# Patient Record
Sex: FEMALE | Race: WHITE | NOT HISPANIC OR LATINO | Employment: FULL TIME | URBAN - NONMETROPOLITAN AREA
[De-identification: names, ages, dates, MRNs, and addresses within clinical notes are randomized per-mention and may not be internally consistent; named-entity substitution may affect disease eponyms.]

---

## 2017-08-09 RX ORDER — PHENOL 1.4 %
10 AEROSOL, SPRAY (ML) MUCOUS MEMBRANE
COMMUNITY
End: 2019-11-26

## 2017-08-09 RX ORDER — TURMERIC ROOT EXTRACT 500 MG
500 TABLET ORAL 2 TIMES DAILY
COMMUNITY
End: 2019-11-26

## 2017-08-22 ENCOUNTER — HOSPITAL ENCOUNTER (OUTPATIENT)
Facility: HOSPITAL | Age: 52
Setting detail: HOSPITAL OUTPATIENT SURGERY
Discharge: HOME | End: 2017-08-22
Attending: OPHTHALMOLOGY | Admitting: OPHTHALMOLOGY

## 2017-08-22 ENCOUNTER — ANESTHESIA EVENT (OUTPATIENT)
Dept: PERIOP | Facility: HOSPITAL | Age: 52
End: 2017-08-22

## 2017-08-22 ENCOUNTER — ANESTHESIA (OUTPATIENT)
Dept: PERIOP | Facility: HOSPITAL | Age: 52
End: 2017-08-22

## 2017-08-22 VITALS
HEART RATE: 78 BPM | WEIGHT: 200 LBS | RESPIRATION RATE: 18 BRPM | SYSTOLIC BLOOD PRESSURE: 135 MMHG | BODY MASS INDEX: 36.8 KG/M2 | HEIGHT: 62 IN | DIASTOLIC BLOOD PRESSURE: 85 MMHG | OXYGEN SATURATION: 98 % | TEMPERATURE: 98.1 F

## 2017-08-22 PROBLEM — H26.9 CATARACT OF RIGHT EYE: Status: ACTIVE | Noted: 2017-08-22

## 2017-08-22 PROBLEM — H26.9 CATARACT OF RIGHT EYE: Status: RESOLVED | Noted: 2017-08-22 | Resolved: 2017-08-22

## 2017-08-22 LAB — B-HCG UR QL: NEGATIVE

## 2017-08-22 PROCEDURE — 25010000002 EPINEPHRINE 1 MG/ML SOLUTION 1 ML VIAL: Performed by: OPHTHALMOLOGY

## 2017-08-22 PROCEDURE — 81025 URINE PREGNANCY TEST: CPT | Performed by: OPHTHALMOLOGY

## 2017-08-22 PROCEDURE — V2632 POST CHMBR INTRAOCULAR LENS: HCPCS | Performed by: OPHTHALMOLOGY

## 2017-08-22 PROCEDURE — 25010000002 MIDAZOLAM PER 1 MG: Performed by: NURSE ANESTHETIST, CERTIFIED REGISTERED

## 2017-08-22 DEVICE — LENS ACRYSOF IQ 6X13MM SN60WF 18.5: Type: IMPLANTABLE DEVICE | Site: EYE | Status: FUNCTIONAL

## 2017-08-22 RX ORDER — CYCLOPENTOLATE HYDROCHLORIDE 20 MG/ML
1 SOLUTION/ DROPS OPHTHALMIC
Status: COMPLETED | OUTPATIENT
Start: 2017-08-22 | End: 2017-08-22

## 2017-08-22 RX ORDER — PHENYLEPHRINE HYDROCHLORIDE 100 MG/ML
SOLUTION/ DROPS OPHTHALMIC
Status: COMPLETED
Start: 2017-08-22 | End: 2017-08-22

## 2017-08-22 RX ORDER — PILOCARPINE HYDROCHLORIDE 10 MG/ML
SOLUTION/ DROPS OPHTHALMIC AS NEEDED
Status: DISCONTINUED | OUTPATIENT
Start: 2017-08-22 | End: 2017-08-22 | Stop reason: HOSPADM

## 2017-08-22 RX ORDER — PHENYLEPHRINE HYDROCHLORIDE 100 MG/ML
1 SOLUTION/ DROPS OPHTHALMIC
Status: COMPLETED | OUTPATIENT
Start: 2017-08-22 | End: 2017-08-22

## 2017-08-22 RX ORDER — CYCLOPENTOLATE HYDROCHLORIDE 20 MG/ML
SOLUTION/ DROPS OPHTHALMIC
Status: COMPLETED
Start: 2017-08-22 | End: 2017-08-22

## 2017-08-22 RX ORDER — MIDAZOLAM HYDROCHLORIDE 1 MG/ML
INJECTION INTRAMUSCULAR; INTRAVENOUS AS NEEDED
Status: DISCONTINUED | OUTPATIENT
Start: 2017-08-22 | End: 2017-08-22 | Stop reason: SURG

## 2017-08-22 RX ORDER — ENALAPRILAT 2.5 MG/2ML
1.25 INJECTION INTRAVENOUS ONCE AS NEEDED
Status: DISCONTINUED | OUTPATIENT
Start: 2017-08-22 | End: 2017-08-22 | Stop reason: HOSPADM

## 2017-08-22 RX ORDER — PREDNISOLONE ACETATE 10 MG/ML
SUSPENSION/ DROPS OPHTHALMIC AS NEEDED
Status: DISCONTINUED | OUTPATIENT
Start: 2017-08-22 | End: 2017-08-22 | Stop reason: HOSPADM

## 2017-08-22 RX ORDER — BALANCED SALT SOLUTION 6.4; .75; .48; .3; 3.9; 1.7 MG/ML; MG/ML; MG/ML; MG/ML; MG/ML; MG/ML
SOLUTION OPHTHALMIC AS NEEDED
Status: DISCONTINUED | OUTPATIENT
Start: 2017-08-22 | End: 2017-08-22 | Stop reason: HOSPADM

## 2017-08-22 RX ORDER — TETRACAINE HYDROCHLORIDE 5 MG/ML
SOLUTION OPHTHALMIC
Status: COMPLETED
Start: 2017-08-22 | End: 2017-08-22

## 2017-08-22 RX ORDER — SODIUM CHLORIDE 0.9 % (FLUSH) 0.9 %
3 SYRINGE (ML) INJECTION AS NEEDED
Status: DISCONTINUED | OUTPATIENT
Start: 2017-08-22 | End: 2017-08-22 | Stop reason: HOSPADM

## 2017-08-22 RX ORDER — SODIUM CHLORIDE, SODIUM LACTATE, POTASSIUM CHLORIDE, CALCIUM CHLORIDE 600; 310; 30; 20 MG/100ML; MG/100ML; MG/100ML; MG/100ML
500 INJECTION, SOLUTION INTRAVENOUS CONTINUOUS PRN
Status: DISCONTINUED | OUTPATIENT
Start: 2017-08-22 | End: 2017-08-22 | Stop reason: HOSPADM

## 2017-08-22 RX ORDER — LIDOCAINE HYDROCHLORIDE 40 MG/ML
INJECTION, SOLUTION RETROBULBAR; TOPICAL AS NEEDED
Status: DISCONTINUED | OUTPATIENT
Start: 2017-08-22 | End: 2017-08-22 | Stop reason: HOSPADM

## 2017-08-22 RX ORDER — SODIUM CHLORIDE 0.9 % (FLUSH) 0.9 %
1-10 SYRINGE (ML) INJECTION AS NEEDED
Status: DISCONTINUED | OUTPATIENT
Start: 2017-08-22 | End: 2017-08-22 | Stop reason: HOSPADM

## 2017-08-22 RX ORDER — PREDNISOLONE ACETATE 10 MG/ML
1 SUSPENSION/ DROPS OPHTHALMIC
Status: DISCONTINUED | OUTPATIENT
Start: 2017-08-22 | End: 2017-08-22 | Stop reason: HOSPADM

## 2017-08-22 RX ORDER — TETRACAINE HYDROCHLORIDE 5 MG/ML
1 SOLUTION OPHTHALMIC SEE ADMIN INSTRUCTIONS
Status: COMPLETED | OUTPATIENT
Start: 2017-08-22 | End: 2017-08-22

## 2017-08-22 RX ORDER — PREDNISOLONE ACETATE 10 MG/ML
SUSPENSION/ DROPS OPHTHALMIC
Status: DISCONTINUED
Start: 2017-08-22 | End: 2017-08-22 | Stop reason: HOSPADM

## 2017-08-22 RX ADMIN — TETRACAINE HYDROCHLORIDE 1 DROP: 5 SOLUTION OPHTHALMIC at 09:52

## 2017-08-22 RX ADMIN — CYCLOPENTOLATE HYDROCHLORIDE 1 DROP: 20 SOLUTION/ DROPS OPHTHALMIC at 09:53

## 2017-08-22 RX ADMIN — SODIUM CHLORIDE, POTASSIUM CHLORIDE, SODIUM LACTATE AND CALCIUM CHLORIDE 500 ML: 600; 310; 30; 20 INJECTION, SOLUTION INTRAVENOUS at 10:02

## 2017-08-22 RX ADMIN — TETRACAINE HYDROCHLORIDE 1 DROP: 5 SOLUTION OPHTHALMIC at 09:51

## 2017-08-22 RX ADMIN — CYCLOPENTOLATE HYDROCHLORIDE 1 DROP: 20 SOLUTION/ DROPS OPHTHALMIC at 10:03

## 2017-08-22 RX ADMIN — MIDAZOLAM HYDROCHLORIDE 0.5 MG: 1 INJECTION, SOLUTION INTRAMUSCULAR; INTRAVENOUS at 10:45

## 2017-08-22 RX ADMIN — MIDAZOLAM HYDROCHLORIDE 0.5 MG: 1 INJECTION, SOLUTION INTRAMUSCULAR; INTRAVENOUS at 10:50

## 2017-08-22 RX ADMIN — MIDAZOLAM HYDROCHLORIDE 0.5 MG: 1 INJECTION, SOLUTION INTRAMUSCULAR; INTRAVENOUS at 10:53

## 2017-08-22 RX ADMIN — PHENYLEPHRINE HYDROCHLORIDE 1 DROP: 100 SOLUTION/ DROPS OPHTHALMIC at 09:53

## 2017-08-22 RX ADMIN — PHENYLEPHRINE HYDROCHLORIDE 1 DROP: 100 SOLUTION/ DROPS OPHTHALMIC at 10:03

## 2017-08-22 RX ADMIN — PHENYLEPHRINE HYDROCHLORIDE 1 DROP: 100 SOLUTION/ DROPS OPHTHALMIC at 09:58

## 2017-08-22 RX ADMIN — CYCLOPENTOLATE HYDROCHLORIDE 1 DROP: 20 SOLUTION/ DROPS OPHTHALMIC at 09:58

## 2017-08-22 RX ADMIN — MIDAZOLAM HYDROCHLORIDE 1 MG: 1 INJECTION, SOLUTION INTRAMUSCULAR; INTRAVENOUS at 10:43

## 2017-08-22 RX ADMIN — MIDAZOLAM HYDROCHLORIDE 0.5 MG: 1 INJECTION, SOLUTION INTRAMUSCULAR; INTRAVENOUS at 10:56

## 2017-08-22 RX ADMIN — MIDAZOLAM HYDROCHLORIDE 0.5 MG: 1 INJECTION, SOLUTION INTRAMUSCULAR; INTRAVENOUS at 11:06

## 2017-08-22 RX ADMIN — MIDAZOLAM HYDROCHLORIDE 0.5 MG: 1 INJECTION, SOLUTION INTRAMUSCULAR; INTRAVENOUS at 10:59

## 2017-08-22 NOTE — PLAN OF CARE
Problem: Cataract (Adult)  Goal: Signs and Symptoms of Listed Potential Problems Will be Absent or Manageable (Cataract)  Outcome: Ongoing (interventions implemented as appropriate)    08/22/17 0957   Cataract   Problems Assessed (Cataract) all   Problems Present (Cataract) none

## 2017-08-22 NOTE — ANESTHESIA POSTPROCEDURE EVALUATION
Patient: Elissa Becker    Procedure Summary     Date Anesthesia Start Anesthesia Stop Room / Location    08/22/17 1048  [facility]       Procedure Diagnosis Surgeon Provider    CATARACT PHACO EXTRACTION WITH INTRAOCULAR LENS IMPLANT RIGHT (Right Eye) No diagnosis on file. MD Ar Lantigua CRNA          Anesthesia Type: MAC  Last vitals  BP        Temp        Pulse       Resp        SpO2          Post Anesthesia Care and Evaluation    Patient location during evaluation: PHASE II  Patient participation: complete - patient participated  Level of consciousness: awake  Pain score: 0  Pain management: adequate  Airway patency: patent  Anesthetic complications: No anesthetic complications  PONV Status: none  Cardiovascular status: acceptable  Respiratory status: acceptable  Hydration status: acceptable    Comments: vsss resp spont, reflexes intact, responsive, report given to pacu nurse

## 2017-08-22 NOTE — OP NOTE
OPERATIVE NOTE    DATE OF PROCEDURE: 8/22/2017  PATIENT NAME: Elissa Becker  PATIENT MRN: [id number]  YOB: 1965     PREOPERATIVE DIAGNOSIS: Right nuclear sclerotic cataract.     POSTOPERATIVE DIAGNOSIS: Right nuclear sclerotic cataract.     PROCEDURE PERFORMED: Phacoemulsification with implantation of a 18.5 diopter foldable posterior chamber intraocular lens, Right eye.     SURGEON: Shannan Nelson MD      INDICATIONS: The patient is an 52 y.o. female with a Right nuclear sclerotic cataract with a preoperative visual acuity of 20/100. The patient desires better vision and is brought to the operating room at this time for elective cataract extraction with planned IOL.      DESCRIPTION OF PROCEDURE: The patient was brought to the operating room in the fasting state. Once all the vital signs were established to be within normal limits and supplemental oxygen was given, the area of the operative eye was prepped and draped in the usual fashion. A wire lid speculum was inserted into the cul-de-sac. Additional topical anesthetic drops were instilled. A fornix-based conjunctival flap was performed from the 11-o'clock to 1-o'clock position. A stab incision was made in the peripheral cornea with the #75 Micro-Sharp blade. Next 0.3 mL of 1% unpreserved Xylocaine was injected in the anterior chamber. The anterior chamber was then entered with a 2.4 mm keratome blade and then under viscoelastic a capsulotomy was performed using the disposable cystotome in a continuous curvilinear fashion. The anterior capsule was then removed and the phacoemulsification handpiece was then introduced into the anterior segment and the nucleus was emulsified without difficulty. The CDE was 4.04. Once this was accomplished, the irrigation and aspiration handpiece was then used to aspirate the residual cortex. The posterior capsule was cleaned of any residual material and then polished with the irrigation and aspiration  handpiece and the viscoelastic was used to inflate the capsular bag. The Manolo AcrySof implant was then loaded into the microcartridge. It measured 18.5 diopters. The injector was then introduced into the capsular bag and the implant was slowly implanted without difficulty. The lens was then rotated to the appropriate axis and the viscoelastic was aspirated. The conjunctiva was then repositioned. Topical Betoptic-S, pilocarpine, and Pred-Forte drops were applied. Polysporin ointment was then instilled. There were no anesthetic or surgical difficulties. The patient tolerated the procedure very well and was returned to same day surgery in satisfactory condition.       Implant Name Type Inv. Item Serial No.  Lot No. LRB No. Used   LENS ACRYSOF IQ 6X13MM SN60WF 18.5 - [id number] - [id number] Implant LENS ACRYSOF IQ 6X13MM SN60WF 18.5 [id number] MANOLO   Right 1           Shannan Nelson MD  8/22/2017

## 2017-08-22 NOTE — ANESTHESIA PREPROCEDURE EVALUATION
Anesthesia Evaluation     Patient summary reviewed and Nursing notes reviewed   NPO Solid Status: > 8 hours  NPO Liquid Status: > 8 hours     Airway   Mallampati: II  TM distance: <3 FB  Neck ROM: full  possible difficult intubation and no difficulty expected  Dental - normal exam     Pulmonary - negative pulmonary ROS and normal exam   (+) decreased breath sounds,   (-) COPD, asthma, shortness of breath, not a smoker  Cardiovascular - negative cardio ROS and normal exam  Exercise tolerance: good (4-7 METS)        Neuro/Psych  (+) psychiatric history Anxiety and Depression,    (-) seizures, TIA, CVA  GI/Hepatic/Renal/Endo    (+) obesity, morbid obesity, GERD well controlled,     Musculoskeletal (-) negative ROS    Abdominal   (+) obese,    Substance History      OB/GYN negative ob/gyn ROS         Other        ROS/Med Hx Other: Urine preg negative                                Anesthesia Plan    ASA 3     MAC   (Risks and benefits discussed including risk of aspiration, recall and dental damage. All patient questions answered. Will continue with POC.)  intravenous induction   Anesthetic plan and risks discussed with patient.

## 2018-09-27 ENCOUNTER — HOSPITAL ENCOUNTER (OUTPATIENT)
Facility: HOSPITAL | Age: 53
Setting detail: HOSPITAL OUTPATIENT SURGERY
End: 2018-09-27
Attending: OPHTHALMOLOGY | Admitting: OPHTHALMOLOGY

## 2019-11-26 RX ORDER — ARIPIPRAZOLE 5 MG/1
5 TABLET ORAL DAILY
COMMUNITY

## 2019-11-26 RX ORDER — CITALOPRAM 20 MG/1
20 TABLET ORAL DAILY
COMMUNITY

## 2019-11-26 RX ORDER — DOXEPIN HYDROCHLORIDE 100 MG/1
100 CAPSULE ORAL NIGHTLY
COMMUNITY

## 2019-11-26 RX ORDER — CALCIUM CARBONATE 200(500)MG
1 TABLET,CHEWABLE ORAL AS NEEDED
COMMUNITY

## 2019-12-02 ENCOUNTER — HOSPITAL ENCOUNTER (OUTPATIENT)
Facility: HOSPITAL | Age: 54
Setting detail: HOSPITAL OUTPATIENT SURGERY
Discharge: HOME | End: 2019-12-02
Attending: OPHTHALMOLOGY | Admitting: OPHTHALMOLOGY

## 2019-12-02 ENCOUNTER — ANESTHESIA EVENT (OUTPATIENT)
Dept: PERIOP | Facility: HOSPITAL | Age: 54
End: 2019-12-02

## 2019-12-02 ENCOUNTER — ANESTHESIA (OUTPATIENT)
Dept: PERIOP | Facility: HOSPITAL | Age: 54
End: 2019-12-02

## 2019-12-02 VITALS
HEART RATE: 65 BPM | HEIGHT: 62 IN | DIASTOLIC BLOOD PRESSURE: 79 MMHG | SYSTOLIC BLOOD PRESSURE: 124 MMHG | WEIGHT: 230 LBS | TEMPERATURE: 98.6 F | RESPIRATION RATE: 18 BRPM | BODY MASS INDEX: 42.33 KG/M2 | OXYGEN SATURATION: 95 %

## 2019-12-02 LAB
B-HCG UR QL: NEGATIVE
INTERNAL NEGATIVE CONTROL: NEGATIVE
INTERNAL POSITIVE CONTROL: POSITIVE
Lab: NORMAL

## 2019-12-02 PROCEDURE — V2632 POST CHMBR INTRAOCULAR LENS: HCPCS | Performed by: OPHTHALMOLOGY

## 2019-12-02 PROCEDURE — 81025 URINE PREGNANCY TEST: CPT | Performed by: OPHTHALMOLOGY

## 2019-12-02 PROCEDURE — 25010000002 PROPOFOL 10 MG/ML EMULSION: Performed by: NURSE ANESTHETIST, CERTIFIED REGISTERED

## 2019-12-02 DEVICE — LENS ACRYSOF IQ 6X13MM SN60WF 18.0: Type: IMPLANTABLE DEVICE | Site: POSTERIOR CHAMBER | Status: FUNCTIONAL

## 2019-12-02 RX ORDER — BALANCED SALT SOLUTION 6.4; .75; .48; .3; 3.9; 1.7 MG/ML; MG/ML; MG/ML; MG/ML; MG/ML; MG/ML
SOLUTION OPHTHALMIC AS NEEDED
Status: DISCONTINUED | OUTPATIENT
Start: 2019-12-02 | End: 2019-12-02 | Stop reason: HOSPADM

## 2019-12-02 RX ORDER — PREDNISOLONE ACETATE 10 MG/ML
1 SUSPENSION/ DROPS OPHTHALMIC SEE ADMIN INSTRUCTIONS
Status: DISCONTINUED | OUTPATIENT
Start: 2019-12-02 | End: 2019-12-02 | Stop reason: HOSPADM

## 2019-12-02 RX ORDER — PHENYLEPHRINE HYDROCHLORIDE 100 MG/ML
1 SOLUTION/ DROPS OPHTHALMIC
Status: COMPLETED | OUTPATIENT
Start: 2019-12-02 | End: 2019-12-02

## 2019-12-02 RX ORDER — SODIUM CHLORIDE, SODIUM LACTATE, POTASSIUM CHLORIDE, CALCIUM CHLORIDE 600; 310; 30; 20 MG/100ML; MG/100ML; MG/100ML; MG/100ML
1000 INJECTION, SOLUTION INTRAVENOUS CONTINUOUS
Status: DISCONTINUED | OUTPATIENT
Start: 2019-12-02 | End: 2019-12-02 | Stop reason: HOSPADM

## 2019-12-02 RX ORDER — ACETAZOLAMIDE 500 MG/1
500 CAPSULE, EXTENDED RELEASE ORAL ONCE
Status: COMPLETED | OUTPATIENT
Start: 2019-12-02 | End: 2019-12-02

## 2019-12-02 RX ORDER — SODIUM CHLORIDE 0.9 % (FLUSH) 0.9 %
1-10 SYRINGE (ML) INJECTION AS NEEDED
Status: DISCONTINUED | OUTPATIENT
Start: 2019-12-02 | End: 2019-12-02 | Stop reason: HOSPADM

## 2019-12-02 RX ORDER — SODIUM CHLORIDE 0.9 % (FLUSH) 0.9 %
3 SYRINGE (ML) INJECTION EVERY 12 HOURS SCHEDULED
Status: DISCONTINUED | OUTPATIENT
Start: 2019-12-02 | End: 2019-12-02 | Stop reason: HOSPADM

## 2019-12-02 RX ORDER — TETRACAINE HYDROCHLORIDE 5 MG/ML
1 SOLUTION OPHTHALMIC SEE ADMIN INSTRUCTIONS
Status: COMPLETED | OUTPATIENT
Start: 2019-12-02 | End: 2019-12-02

## 2019-12-02 RX ORDER — CYCLOPENTOLATE HYDROCHLORIDE 20 MG/ML
1 SOLUTION/ DROPS OPHTHALMIC
Status: COMPLETED | OUTPATIENT
Start: 2019-12-02 | End: 2019-12-02

## 2019-12-02 RX ORDER — KETAMINE HYDROCHLORIDE 50 MG/ML
INJECTION, SOLUTION, CONCENTRATE INTRAMUSCULAR; INTRAVENOUS AS NEEDED
Status: DISCONTINUED | OUTPATIENT
Start: 2019-12-02 | End: 2019-12-02 | Stop reason: SURG

## 2019-12-02 RX ORDER — TETRACAINE HYDROCHLORIDE 5 MG/ML
SOLUTION OPHTHALMIC AS NEEDED
Status: DISCONTINUED | OUTPATIENT
Start: 2019-12-02 | End: 2019-12-02 | Stop reason: HOSPADM

## 2019-12-02 RX ORDER — PREDNISOLONE ACETATE 10 MG/ML
SUSPENSION/ DROPS OPHTHALMIC AS NEEDED
Status: DISCONTINUED | OUTPATIENT
Start: 2019-12-02 | End: 2019-12-02 | Stop reason: HOSPADM

## 2019-12-02 RX ORDER — ONDANSETRON 2 MG/ML
4 INJECTION INTRAMUSCULAR; INTRAVENOUS ONCE AS NEEDED
Status: CANCELLED | OUTPATIENT
Start: 2019-12-02 | End: 2019-12-02

## 2019-12-02 RX ORDER — PROPOFOL 10 MG/ML
VIAL (ML) INTRAVENOUS AS NEEDED
Status: DISCONTINUED | OUTPATIENT
Start: 2019-12-02 | End: 2019-12-02 | Stop reason: SURG

## 2019-12-02 RX ORDER — LIDOCAINE HYDROCHLORIDE 40 MG/ML
INJECTION, SOLUTION RETROBULBAR; TOPICAL AS NEEDED
Status: DISCONTINUED | OUTPATIENT
Start: 2019-12-02 | End: 2019-12-02 | Stop reason: HOSPADM

## 2019-12-02 RX ORDER — LIDOCAINE HYDROCHLORIDE 20 MG/ML
INJECTION, SOLUTION INTRAVENOUS AS NEEDED
Status: DISCONTINUED | OUTPATIENT
Start: 2019-12-02 | End: 2019-12-02 | Stop reason: SURG

## 2019-12-02 RX ADMIN — PHENYLEPHRINE HYDROCHLORIDE 1 DROP: 100 SOLUTION/ DROPS OPHTHALMIC at 13:55

## 2019-12-02 RX ADMIN — CYCLOPENTOLATE HYDROCHLORIDE 1 DROP: 20 SOLUTION/ DROPS OPHTHALMIC at 13:50

## 2019-12-02 RX ADMIN — PHENYLEPHRINE HYDROCHLORIDE 1 DROP: 100 SOLUTION/ DROPS OPHTHALMIC at 13:50

## 2019-12-02 RX ADMIN — PROPOFOL 100 MG: 10 INJECTION, EMULSION INTRAVENOUS at 14:37

## 2019-12-02 RX ADMIN — KETAMINE HYDROCHLORIDE 20 MG: 50 INJECTION, SOLUTION INTRAMUSCULAR; INTRAVENOUS at 14:31

## 2019-12-02 RX ADMIN — LIDOCAINE HYDROCHLORIDE 100 MG: 20 INJECTION, SOLUTION INTRAVENOUS at 14:31

## 2019-12-02 RX ADMIN — PHENYLEPHRINE HYDROCHLORIDE 1 DROP: 100 SOLUTION/ DROPS OPHTHALMIC at 13:45

## 2019-12-02 RX ADMIN — TETRACAINE HYDROCHLORIDE 1 DROP: 5 SOLUTION OPHTHALMIC at 13:43

## 2019-12-02 RX ADMIN — ACETAZOLAMIDE 500 MG: 500 CAPSULE, EXTENDED RELEASE ORAL at 14:55

## 2019-12-02 RX ADMIN — TETRACAINE HYDROCHLORIDE 1 DROP: 5 SOLUTION OPHTHALMIC at 13:44

## 2019-12-02 RX ADMIN — CYCLOPENTOLATE HYDROCHLORIDE 1 DROP: 20 SOLUTION/ DROPS OPHTHALMIC at 13:55

## 2019-12-02 RX ADMIN — CYCLOPENTOLATE HYDROCHLORIDE 1 DROP: 20 SOLUTION/ DROPS OPHTHALMIC at 13:45

## 2019-12-02 RX ADMIN — SODIUM CHLORIDE, POTASSIUM CHLORIDE, SODIUM LACTATE AND CALCIUM CHLORIDE 1000 ML: 600; 310; 30; 20 INJECTION, SOLUTION INTRAVENOUS at 13:50

## 2019-12-02 NOTE — OP NOTE
OPERATIVE NOTE    Date of Procedure: 12/2/2019  Patient Name: Elissa eBcker  Patient MRN: [id number]  YOB: 1965     Preoperative Diagnosis: Left nuclear sclerotic cataract.     Postoperative Diagnosis: Left nuclear sclerotic cataract.     Procedure Performed: Phacoemulsification with implantation of a  foldable posterior chamber intraocular lens, Left eye.     Surgeon: Teto Moser MD     Anesthesia:  Monitored Anesthesia Care (MAC)      Brief History and Indication: The patient presents with a history of past progressive loss of vision.  Patient was diagnosed with cataract and requests removal for increased ability to read and see.     Operation Description: The patient was taken to the OR and prepped and draped in the usual sterile ophthalmic fashion. A lid speculum was placed in the eye.  A #75 blade was then used to make a stab incision two o’clock hours from the intended temporal clear cornea groove. The anterior chamber was then inflated with a Viscoelastic. A metal microkeratome blade was then used to enter the anterior chamber at the temporal clear cornea site. A three level tunnel incision was made. A curvilinear capsulorrhexis was then performed with a bent cystotome needle and capsulorrhexis forceps.  BSS on a 30 gauge bent cannula was used to hydro-dissect, and hydro-delineate the lens. Good fluid waves and hydro-delineation were noted. Phacoemulsification was then used to remove nuclear material without complications. The residual cortical and lenticular material was then removed with irrigation and aspiration. Viscoelastics were then used to inflate the bag in a soft shell technique. A PCIOL was injected into the bag. Post-implantation, there were no rents or tears in the bag and the lens was noted to be stable and centered. The residual Viscoelastic was then removed with irrigation and aspiration.  The wound was checked and found to be without leaks. Therefore a Polydex ointment  and one drop of a Prednisilone eye drop was placed in the eye.     Implant Information:   Implant Name Type Inv. Item Serial No.  Lot No. LRB No. Used   LENS ACRYSOF IQ 6X13MM SN60WF 18.0 - [id number] - [id number] Implant LENS ACRYSOF IQ 6X13MM SN60WF 18.0 [id number] MONI  Left 1       Complications: None    Discharge and Condition  The patient was transported to same day surgery in excellent condition and scheduled for follow-up tomorrow morning. The patient was given instructions on use of eye drops for the operative eye and was specifically instructed to call Dr. Moser at his office or home for any nausea, vomiting, headache, decreased visual acuity, or pain, or if the patient had any general concerns.    Teto Moser MD  12/2/2019

## 2019-12-02 NOTE — DISCHARGE INSTRUCTIONS
Post Operative Cataract Instructions     Left Eye    1.  Wear eye shield at bedtime for 3 nights.  You may wear glasses or sunglasses during the day.  You must keep eye protected at all times.  2.  Try not to sleep on the side in which the eye was operated (1-2 weeks).  3.  No heavy lifting (at least 3 days).  No bending below the waist.  Keep your head above your heart.  4.  Try not to cough or sneeze excessively.  5.  Bring eye drops and instructions with you to post-op appointment.  6.  If eyes become stuck together after surgery you may soak with warm cloth.  7. Start Prednisilone (Pred-Forte) today as soon as you get home.   *Apply 1 drop to operative eye four times a day for 7 days and then twice daily until all medication is gone.    Complications from cataract surgery usually occur within the first couple of weeks.  Complications could include excessive redness, pain, decreased vision, or changes in vision.  If problems are treated early, there is a better chance of resolution.  Please contact Dr. Moser at one of the numbers listed below if you are experiencing any problems.  If a holiday, evenings, or weekends, do not hesitate to call if you are having a problem.      Dr. Mg Moser    [phone] CELL  [phone] CELL    [phone] CELL             [phone] CELL        If you are unable to reach any of the above doctors, please call [facility] at [phone]    IMPORTANT INFORMATION  If you have any questions or need any refills on your eye drops, please call the office at [phone].No pushing, pulling, tugging, heavy lifting, or strenuous activity.  No major decision making, driving, or drinking alcoholic beverages for 24 hours. (due to the medications you have received)  Always use good hand hygiene/washing techniques.  NO driving while taking pain medications.    To assist you in voiding:  Drink plenty of fluids  Listen to  running water while attempting to void.    If you are unable to urinate and you have an uncomfortable urge to void or it has been   6 hours since you were discharged, return to the Emergency Room

## 2019-12-02 NOTE — ANESTHESIA PREPROCEDURE EVALUATION
Anesthesia Evaluation     Patient summary reviewed and Nursing notes reviewed   no history of anesthetic complications:  NPO Solid Status: > 8 hours  NPO Liquid Status: > 8 hours           Airway   Mallampati: II  TM distance: >3 FB  Neck ROM: full  no difficulty expected  Dental - normal exam     Pulmonary - negative pulmonary ROS and normal exam   Cardiovascular - negative cardio ROS and normal exam    Rhythm: regular  Rate: normal        Neuro/Psych  (+) psychiatric history Anxiety and Depression,     GI/Hepatic/Renal/Endo    (+)  GERD,      Musculoskeletal (-) negative ROS    Abdominal    Substance History - negative use     OB/GYN negative ob/gyn ROS         Other - negative ROS                       Anesthesia Plan    ASA 2     MAC   (Pt told that intravenous sedation will be used as the primary anesthetic along with local anesthesia if necessary. Every effort will be made to make sure the patient is comfortable.     The patient was told they may or may not have recall for the procedure. It was further explained that if the MAC was not adequate that a general anesthetic with either an LMA or endotracheal tube would be required.     Will proceed with the plan of care.)  intravenous induction     Anesthetic plan, all risks, benefits, and alternatives have been provided, discussed and informed consent has been obtained with: patient.

## 2019-12-02 NOTE — ANESTHESIA POSTPROCEDURE EVALUATION
Patient: Elissa Becker    Procedure Summary     Date:  12/02/19 Room / Location:  [facility] OR 1 /  [facility]    Anesthesia Start:  1427 Anesthesia Stop:  1439    Procedure:  CATARACT PHACO EXTRACTION WITH INTRAOCULAR LENS IMPLANT LEFT (Left Eye) Diagnosis:       Age-related nuclear cataract of left eye      (Age-related nuclear cataract of left eye [H25.12])    Surgeon:  Teto Moser MD Provider:  Teto Gray CRNA    Anesthesia Type:  MAC ASA Status:  2          Anesthesia Type: MAC  Last vitals  BP   124/79 (12/02/19 1502)   Temp   98.6 °F (37 °C) (12/02/19 1440)   Pulse   65 (12/02/19 1502)   Resp   18 (12/02/19 1502)     SpO2   95 % (12/02/19 1502)     Post Anesthesia Care and Evaluation    Patient location during evaluation: PHASE II  Patient participation: complete - patient participated  Level of consciousness: awake  Pain score: 1  Pain management: adequate  Airway patency: patent  Anesthetic complications: No anesthetic complications  PONV Status: controlled  Cardiovascular status: acceptable and stable  Respiratory status: acceptable  Hydration status: acceptable

## 2019-12-02 NOTE — H&P
[facility]         History and Physical    Patient Name: Elissa Becker  MRN: [id number]  : 1965  Gender: female     HPI: Patient complaint of PPLOV Left eye diagnosed with cataract. Patient requests PHACO PCIOL for Increase of VA/ADL.    History:    Past Medical History:   Diagnosis Date   • Acid reflux    • Anxiety    • Cataract, bilateral    • Depression    • Wears glasses        Past Surgical History:   Procedure Laterality Date   • BREAST LUMPECTOMY Left     BENIGN   • CATARACT EXTRACTION W/ INTRAOCULAR LENS IMPLANT Right 2017    Procedure: CATARACT PHACO EXTRACTION WITH INTRAOCULAR LENS IMPLANT RIGHT;  Surgeon: Shannan Nelson MD;  Location: [facility];  Service:    • COLONOSCOPY     • TUBAL ABDOMINAL LIGATION     • WISDOM TOOTH EXTRACTION         Social History     Socioeconomic History   • Marital status:      Spouse name: Not on file   • Number of children: Not on file   • Years of education: Not on file   • Highest education level: Not on file   Tobacco Use   • Smoking status: Never Smoker   • Smokeless tobacco: Never Used   Substance and Sexual Activity   • Alcohol use: No   • Drug use: No   • Sexual activity: Defer       History reviewed. No pertinent family history.    Prior to Admission Medications:  Medications Prior to Admission   Medication Sig Dispense Refill Last Dose   • ARIPiprazole (ABILIFY) 5 MG tablet Take 5 mg by mouth Daily.   2019 at 0800   • BLACK COHOSH EXTRACT PO Take 180 mg by mouth 2 (Two) Times a Day.   2019 at 0800   • calcium carbonate (TUMS) 500 MG chewable tablet Chew 1 tablet As Needed for Indigestion or Heartburn.   2019 at 1900   • citalopram (CeleXA) 20 MG tablet Take 20 mg by mouth Daily.   2019 at 1700   • doxepin (SINEquan) 100 MG capsule Take 100 mg by mouth Every Night.   Past Week at Unknown time   • Glucos-MSM-C-Wm-Fwvlxp-Lrpowb (GLUCOSAMINE MSM COMPLEX PO) Take 1,500 mg by mouth 2 (Two)  Times a Day.   12/1/2019 at 1700   • MILK THISTLE EXTRACT PO Take 350 mg by mouth 2 (Two) Times a Day.   12/1/2019 at 1700       Allergies:  No Known Allergies     Vitals: Temp:  [97.7 °F (36.5 °C)] 97.7 °F (36.5 °C)  Heart Rate:  [74] 74  Resp:  [18] 18  BP: (152)/(92) 152/92    Review of Systems:   Within Normal Limits Abnormal   HEENT [x]    []     Cardiovascular [x]   []     Gastrointestinal [x]   []     Gentiourinary [x]   []     Neurologic [x]   []     Pulmonary [x]   []       Physical Exam:   Within Normal Limits Abnormal   HEENT [x]    []     Heart [x]   []     Lungs [x]   []     Abdomen [x]   []     Extremities [x]   []       Impression: Left nuclear sclerotic cataract.     Plan: CATARACT PHACO EXTRACTION WITH INTRAOCULAR LENS IMPLANT LEFT (Left)     Teto Moser MD  12/2/2019

## (undated) DEVICE — CANN IRR/INJ AIR ANT CHAMBER 6MM BEND 27G

## (undated) DEVICE — CLEAR CORNEAL KNIFE 2.4MM ANG: Brand: SHARPOINT

## (undated) DEVICE — SOL IRRIG H2O 1000ML STRL

## (undated) DEVICE — 15 DEG. MICROKNIFE - 3MM: Brand: SHARPOINT

## (undated) DEVICE — KT POSTOP CATARACT PT CARE

## (undated) DEVICE — DRSNG SURESITE123 2.4X2.8IN

## (undated) DEVICE — HP CONCL INTREPID COAX I/A CRV .3MM

## (undated) DEVICE — PREP SOL POVIDONE/IODINE BT 4OZ

## (undated) DEVICE — Device

## (undated) DEVICE — CLEARCUT® HP2 SLIT KNIFE INTREPID MICRO-COAXIAL SYSTEM 2.4 DB: Brand: CLEARCUT®; INTREPID

## (undated) DEVICE — 2.0MM SHARPTOME™ CRESCENT KNIFE ANGLED, BEVEL UP: Brand: SHARPOINT

## (undated) DEVICE — GLV SURG SENSICARE W/ALOE PF LF 7.5 STRL

## (undated) DEVICE — GLV SURG SENSICARE W/ALOE PF LF 7 STRL

## (undated) DEVICE — 0.8MM CLEARPORT PARA KNIFE: Brand: SHARPOINT

## (undated) DEVICE — POST OP EYE CARE KIT: Brand: MEDLINE

## (undated) DEVICE — GLV SURG SENSICARE W/ALOE PF LF 8 STRL

## (undated) DEVICE — SYR LUERLOK 5CC

## (undated) DEVICE — SKIN MARKER,FINE TIP: Brand: DEVON

## (undated) DEVICE — PK CATARACT OPTHAMALOGY

## (undated) DEVICE — SOL IRRIG NACL 9PCT 1000ML BTL

## (undated) DEVICE — EYE PAK* 1033 NON-WOVEN OPHTHALMIC DRAPE APERTURE POUCHES: Brand: ALCON EYE-PAK